# Patient Record
Sex: FEMALE | Race: WHITE
[De-identification: names, ages, dates, MRNs, and addresses within clinical notes are randomized per-mention and may not be internally consistent; named-entity substitution may affect disease eponyms.]

---

## 2018-01-26 ENCOUNTER — HOSPITAL ENCOUNTER (EMERGENCY)
Dept: HOSPITAL 63 - ER | Age: 48
Discharge: HOME | End: 2018-01-26
Payer: OTHER GOVERNMENT

## 2018-01-26 VITALS — SYSTOLIC BLOOD PRESSURE: 141 MMHG | DIASTOLIC BLOOD PRESSURE: 86 MMHG

## 2018-01-26 DIAGNOSIS — R10.11: Primary | ICD-10-CM

## 2018-01-26 DIAGNOSIS — I10: ICD-10-CM

## 2018-01-26 LAB
AMORPH SED URNS QL MICRO: PRESENT /HPF
APTT PPP: YELLOW S
BACTERIA #/AREA URNS HPF: 0 /HPF
BILIRUB UR QL STRIP: (no result)
FIBRINOGEN PPP-MCNC: (no result) MG/DL
GLUCOSE UR STRIP-MCNC: (no result) MG/DL
NITRITE UR QL STRIP: (no result)
RBC #/AREA URNS HPF: 0 /HPF (ref 0–2)
SP GR UR STRIP: 1.02
SQUAMOUS #/AREA URNS LPF: (no result) /LPF
U PREG PATIENT: NEGATIVE
UROBILINOGEN UR-MCNC: 1 MG/DL
WBC #/AREA URNS HPF: (no result) /HPF (ref 0–4)

## 2018-01-26 PROCEDURE — 81001 URINALYSIS AUTO W/SCOPE: CPT

## 2018-01-26 PROCEDURE — 81025 URINE PREGNANCY TEST: CPT

## 2018-01-26 PROCEDURE — 76705 ECHO EXAM OF ABDOMEN: CPT

## 2018-01-26 NOTE — PHYS DOC
Past History


Past Medical History:  Hypertension


Past Surgical History:  No Surgical History


Alcohol Use:  None


Drug Use:  None





Adult General


Chief Complaint


Chief Complaint:  ABDOMINAL PAIN





[facility]


HPI





47-year-old female patient complaining of right upper quadrant pain with 

radiation to her back and flank for one month that getting worse at night as a 

pressure pain without nausea, vomiting, diarrhea,  constipation and urinary 

symptom. Patient denies vaginal bleeding or discharge and fever and chills. 

Patient states the pain getting worse for the last few days and rated her pain 7

/10. Patient states she was diagnosed with spinal cord hemangioma  and seen by 

neurosurgeon at [facility] and referred to rheumatologist and waiting for her 

appointment and thinks her pain is related to her back problem. She denies 

focal neurodeficit and urinary and bowel incontinence.





Review of Systems


Review of Systems





Constitutional: Denies fever or chills []


Eyes: Denies change in visual acuity, redness, or eye pain []


HENT: Denies nasal congestion or sore throat []


Respiratory: Denies cough or shortness of breath []


Cardiovascular: No additional information not addressed in HPI []


GI: Denies  nausea, vomiting, bloody stools or diarrhea , reports abdominal pain

[]


: Denies dysuria or hematuria []


Musculoskeletal: Denies  joint pain []


Integument: Denies rash or skin lesions []


Neurologic: Denies headache, focal weakness or sensory changes []


Endocrine: Denies polyuria or polydipsia []





All other systems were reviewed and found to be within normal limits, except as 

documented in this note.





Allergies


Allergies





Allergies








Coded Allergies Type Severity Reaction Last Updated Verified


 


  No Known Drug Allergies    11/14/15 No











Physical Exam


Physical Exam





Constitutional: Well developed, well nourished, mild distress, non-toxic 

appearance. []


HENT: Normocephalic, atraumatic, bilateral external ears normal, oropharynx 

moist, no oral exudates, nose normal. []


Eyes: PERRLA, EOMI, conjunctiva normal, no discharge. [] 


Neck: Normal range of motion, no tenderness, supple, no stridor. [] 


Cardiovascular:Heart rate regular rhythm, no murmur []


Lungs & Thorax:  Bilateral breath sounds clear to auscultation []


Abdomen: Bowel sounds normal, soft, no tenderness, no masses, no pulsatile 

masses. [] 


Skin: Warm, dry, no erythema, no rash. [] 


Back: No tenderness, no CVA tenderness. [] 


Extremities: No tenderness, no cyanosis, no clubbing, ROM intact, no edema. [] 


Neurologic: Alert and oriented X 3, normal motor function, normal sensory 

function, no focal deficits noted. []


Psychologic: Affect normal, judgement normal, mood normal. []





Current Patient Data


Lab Results





 Laboratory Tests








Test


  1/26/18


14:35


 


Urine Collection Type Unknown  


 


Urine Color Yellow  


 


Urine Clarity Cloudy  


 


Urine pH 7.0  


 


Urine Specific Gravity 1.020  


 


Urine Protein


  Neg


(NEG-TRACE)


 


Urine Glucose (UA)


  Neg mg/dL


(NEG)


 


Urine Ketones (Stick)


  Neg mg/dL


(NEG)


 


Urine Blood Neg (NEG)  


 


Urine Nitrite Neg (NEG)  


 


Urine Bilirubin Neg (NEG)  


 


Urine Urobilinogen Dipstick


  1 mg/dL (0.2


mg/dL)


 


Urine Leukocyte Esterase Neg (NEG)  


 


Urine RBC 0 /HPF (0-2)  


 


Urine WBC


  Occ /HPF (0-4)


 


 


Urine Squamous Epithelial


Cells Occ /LPF  


 


 


Urine Amorphous Sediment Present /HPF  


 


Urine Bacteria


  0 /HPF (0-FEW)


 


 


Urine Pregnancy Test


  Negative (NEG)


 











EKG


EKG


[]





Radiology/Procedures


Radiology/Procedures


[]





Course & Med Decision Making


Course & Med Decision Making


Pertinent Labs and Imaging studies reviewed. (See chart for details)


Evaluation of patient in ER showed 47-year-old female patient with chronic 

abdominal and flank pain for 1 month. Patient had unremarkable physical exam, 

UA and gallbladder ultrasound. Patient has appointment with rheumatologist 

regarding spinal cord hemangioma and instructed to follow-up with her physician 

and take pain medication as needed. Prescription for also was given.





[]





Dragon Disclaimer


Dragon Disclaimer


This electronic medical record was generated, in whole or in part, using a 

voice recognition dictation system.





Departure


Departure:


Impression:  


 Primary Impression:  


 Abdominal pain


Disposition:  01 HOME, SELF-CARE (At 1728)


Condition:  STABLE


Referrals:  


MAREN WONG MD (PCP)


Patient Instructions:  Abdominal Pain





Additional Instructions:  


Follow-up with your physician in 2 or 3 days


Plan ice on the affected area


Scripts


Tramadol Hcl (ULTRAM) 50 Mg Tablet


50 MG PO PRN Q6HRS Y for PAIN, #20 TAB


   Prov: ABEL QUESADA MD         1/26/18











ABEL QUESADA MD Jan 26, 2018 16:16

## 2018-01-26 NOTE — RAD
INDICATION : [id number]    RUQ pain x's couple of days

 

COMPARISON: None

 

TECHNIQUE: Multiple ultrasound images obtained through the abdomen in 

grayscale and color.

 

FINDINGS:

 

Liver: Echotexture within normal limits in visualized portions of liver.

Gallbladder: No wall thickening or stones.

IVC: Partially distended at level of liver.

Common Bile Duct: Not dilated.

Pancreas: Difficult visualization. Unremarkable in visualized portion.

Right Kidney:  No hydronephrosis.

 

IMPRESSION:

 

1. No definite bile duct dilation or gallstones.

 

Electronically signed by: Jimmie Dumont MD (1/26/2018 5:15 PM) [id number]